# Patient Record
Sex: FEMALE | Race: WHITE
[De-identification: names, ages, dates, MRNs, and addresses within clinical notes are randomized per-mention and may not be internally consistent; named-entity substitution may affect disease eponyms.]

---

## 2018-12-29 ENCOUNTER — HOSPITAL ENCOUNTER (INPATIENT)
Dept: HOSPITAL 54 - MED | Age: 53
LOS: 5 days | Discharge: HOME | DRG: 248 | End: 2019-01-03
Attending: NURSE PRACTITIONER | Admitting: INTERNAL MEDICINE
Payer: MEDICAID

## 2018-12-29 VITALS — HEIGHT: 61 IN | WEIGHT: 177.56 LBS | BODY MASS INDEX: 33.52 KG/M2

## 2018-12-29 DIAGNOSIS — E78.5: ICD-10-CM

## 2018-12-29 DIAGNOSIS — E87.6: ICD-10-CM

## 2018-12-29 DIAGNOSIS — I25.10: ICD-10-CM

## 2018-12-29 DIAGNOSIS — E83.42: ICD-10-CM

## 2018-12-29 DIAGNOSIS — E66.9: ICD-10-CM

## 2018-12-29 DIAGNOSIS — A04.72: Primary | ICD-10-CM

## 2018-12-29 DIAGNOSIS — E11.9: ICD-10-CM

## 2018-12-29 DIAGNOSIS — I10: ICD-10-CM

## 2018-12-29 PROCEDURE — G0378 HOSPITAL OBSERVATION PER HR: HCPCS

## 2018-12-29 NOTE — NUR
MS RN NOTES 



RECEIVED PATIENT FROM [facility] VIA [facility]ONERY[facility], ALERT, ORIENTED X 4, NO SIGNS OF ACUTE 
RESPIRATORY DISTRESS NOTED. PATIENT COMPLAINED OF ABDOMINAL PAIN, INITIAL PHYSICAL 
ASSESSMENT DONE. HOME MEDS ENTERED WILL INFORM MD TO REVIEW. ORIENTED PATIENT TO ROOM AND 
USE OF CALL LIGHT. SAFETY MEASURES MAINTAINED, BED IN LOW LOCK POSITION, WILL CONTINUE TO 
MONITOR ACCORDINGLY.

## 2018-12-30 VITALS — SYSTOLIC BLOOD PRESSURE: 162 MMHG | DIASTOLIC BLOOD PRESSURE: 81 MMHG

## 2018-12-30 VITALS — SYSTOLIC BLOOD PRESSURE: 156 MMHG | DIASTOLIC BLOOD PRESSURE: 85 MMHG

## 2018-12-30 VITALS — DIASTOLIC BLOOD PRESSURE: 90 MMHG | SYSTOLIC BLOOD PRESSURE: 167 MMHG

## 2018-12-30 VITALS — DIASTOLIC BLOOD PRESSURE: 67 MMHG | SYSTOLIC BLOOD PRESSURE: 154 MMHG

## 2018-12-30 LAB
ALBUMIN SERPL BCP-MCNC: 3.6 G/DL (ref 3.4–5)
ALP SERPL-CCNC: 85 U/L (ref 46–116)
ALT SERPL W P-5'-P-CCNC: 44 U/L (ref 12–78)
APPEARANCE UR: CLEAR
AST SERPL W P-5'-P-CCNC: 36 U/L (ref 15–37)
BASOPHILS # BLD AUTO: 0 /CMM (ref 0–0.2)
BASOPHILS NFR BLD AUTO: 0.5 % (ref 0–2)
BILIRUB SERPL-MCNC: 0.5 MG/DL (ref 0.2–1)
BILIRUB UR QL STRIP: NEGATIVE
BUN SERPL-MCNC: 6 MG/DL (ref 7–18)
CALCIUM SERPL-MCNC: 8.4 MG/DL (ref 8.5–10.1)
CHLORIDE SERPL-SCNC: 102 MMOL/L (ref 98–107)
CHOLEST SERPL-MCNC: 143 MG/DL (ref ?–200)
CO2 SERPL-SCNC: 29 MMOL/L (ref 21–32)
COLOR UR: YELLOW
CREAT SERPL-MCNC: 0.6 MG/DL (ref 0.6–1.3)
EOSINOPHIL NFR BLD AUTO: 2.1 % (ref 0–6)
GLUCOSE SERPL-MCNC: 115 MG/DL (ref 74–106)
GLUCOSE UR STRIP-MCNC: NEGATIVE MG/DL
HCT VFR BLD AUTO: 39 % (ref 33–45)
HDLC SERPL-MCNC: 46 MG/DL (ref 40–60)
HGB BLD-MCNC: 12.9 G/DL (ref 11.5–14.8)
HGB UR QL STRIP: NEGATIVE ERY/UL
KETONES UR STRIP-MCNC: NEGATIVE MG/DL
LDLC SERPL DIRECT ASSAY-MCNC: 84 MG/DL (ref 0–99)
LEUKOCYTE ESTERASE UR QL STRIP: NEGATIVE
LYMPHOCYTES NFR BLD AUTO: 3 /CMM (ref 0.8–4.8)
LYMPHOCYTES NFR BLD AUTO: 32.3 % (ref 20–44)
MAGNESIUM SERPL-MCNC: 1.7 MG/DL (ref 1.8–2.4)
MCHC RBC AUTO-ENTMCNC: 33 G/DL (ref 31–36)
MCV RBC AUTO: 87 FL (ref 82–100)
MONOCYTES NFR BLD AUTO: 0.6 /CMM (ref 0.1–1.3)
MONOCYTES NFR BLD AUTO: 6.2 % (ref 2–12)
NEUTROPHILS # BLD AUTO: 5.4 /CMM (ref 1.8–8.9)
NEUTROPHILS NFR BLD AUTO: 58.9 % (ref 43–81)
NITRITE UR QL STRIP: NEGATIVE
PH UR STRIP: 6.5 [PH] (ref 5–8)
PHOSPHATE SERPL-MCNC: 2.8 MG/DL (ref 2.5–4.9)
PLATELET # BLD AUTO: 418 /CMM (ref 150–450)
POTASSIUM SERPL-SCNC: 2.9 MMOL/L (ref 3.5–5.1)
PROT SERPL-MCNC: 7.4 G/DL (ref 6.4–8.2)
PROT UR QL STRIP: NEGATIVE MG/DL
RBC # BLD AUTO: 4.5 MIL/UL (ref 4–5.2)
SODIUM SERPL-SCNC: 140 MMOL/L (ref 136–145)
TRIGL SERPL-MCNC: 116 MG/DL (ref 30–150)
TSH SERPL DL<=0.005 MIU/L-ACNC: 1.7 UIU/ML (ref 0.36–3.74)
UROBILINOGEN UR STRIP-MCNC: 0.2 EU/DL
WBC NRBC COR # BLD AUTO: 9.2 K/UL (ref 4.3–11)

## 2018-12-30 RX ADMIN — VANCOMYCIN HYDROCHLORIDE SCH MG: 125 CAPSULE ORAL at 23:38

## 2018-12-30 RX ADMIN — Medication PRN MG: at 08:08

## 2018-12-30 RX ADMIN — MAGNESIUM SULFATE IN DEXTROSE SCH MLS/HR: 10 INJECTION, SOLUTION INTRAVENOUS at 10:26

## 2018-12-30 RX ADMIN — VANCOMYCIN HYDROCHLORIDE SCH MG: 125 CAPSULE ORAL at 01:00

## 2018-12-30 RX ADMIN — METRONIDAZOLE SCH MG: 500 TABLET ORAL at 02:14

## 2018-12-30 RX ADMIN — VANCOMYCIN HYDROCHLORIDE SCH MG: 125 CAPSULE ORAL at 06:30

## 2018-12-30 RX ADMIN — VANCOMYCIN HYDROCHLORIDE SCH MG: 125 CAPSULE ORAL at 12:55

## 2018-12-30 RX ADMIN — ATORVASTATIN CALCIUM SCH MG: 10 TABLET, FILM COATED ORAL at 22:02

## 2018-12-30 RX ADMIN — DEXTROSE MONOHYDRATE PRN MG: 50 INJECTION, SOLUTION INTRAVENOUS at 02:22

## 2018-12-30 RX ADMIN — Medication PRN MG: at 15:58

## 2018-12-30 RX ADMIN — VANCOMYCIN HYDROCHLORIDE SCH MG: 125 CAPSULE ORAL at 17:40

## 2018-12-30 RX ADMIN — Medication PRN MG: at 02:15

## 2018-12-30 RX ADMIN — POTASSIUM CHLORIDE SCH MEQ: 1.5 POWDER, FOR SOLUTION ORAL at 12:55

## 2018-12-30 RX ADMIN — METRONIDAZOLE SCH MG: 500 TABLET ORAL at 16:03

## 2018-12-30 RX ADMIN — ALPRAZOLAM PRN MG: 1 TABLET ORAL at 22:07

## 2018-12-30 RX ADMIN — POTASSIUM CHLORIDE SCH MEQ: 1.5 POWDER, FOR SOLUTION ORAL at 10:26

## 2018-12-30 RX ADMIN — SODIUM CHLORIDE PRN MLS/HR: 9 INJECTION, SOLUTION INTRAVENOUS at 02:28

## 2018-12-30 RX ADMIN — POTASSIUM CHLORIDE SCH MEQ: 1.5 POWDER, FOR SOLUTION ORAL at 10:55

## 2018-12-30 RX ADMIN — METRONIDAZOLE SCH MG: 500 TABLET ORAL at 08:06

## 2018-12-30 RX ADMIN — MAGNESIUM SULFATE IN DEXTROSE SCH MLS/HR: 10 INJECTION, SOLUTION INTRAVENOUS at 11:31

## 2018-12-30 RX ADMIN — Medication PRN MG: at 20:12

## 2018-12-30 NOTE — NUR
RN NOTES

 ADMINISTERED MORPHINE SULFATE 0.25 MG  IV PUSH FOR  GENERALIZED PAIN PER PATIENT REQUEAST, 
V/S TAKEN /67, P-64,  CONTINUED MONITORING.

## 2018-12-30 NOTE — NUR
RN NOTES

 RECEIVED PATIENT IN THE BED A/O X4, PATIENT HAS NO ACUTE RESPIRATORY DISTRESS, V/S STABLE, 
SCHEDULED MEDICATION ADMINISTERED. PATIENT SELF CARE. AMBULATORY. INFUSING NS AT  75 ML/HR 
INTACT ON LFT AC AREA INTACT. PATIENT C/O PAIN AT THIS TIME  IN THE ABDOMINAL AREA 8/10. 
CALL LIGHT WITHIN TO REACH, SAFETY PRECAUTION MAINTAINED ALL THE TIME. CONTINUED MONITORING.

## 2018-12-30 NOTE — NUR
RN MS NOTES

RECEIVED PATIENT IN BED AWAKE, ALERT AND ORIENTED X4, VERBALLY RESPONSIVE, ABLE TO MAKE 
NEEDS KNOWN. BREATHING EVEN AND UNLABORED. NO SOB NOTED - TOLERATING ROOM AIR. WITH 
COMPLAINTS OF ABDOMINAL PAIN 8/10. WILL ADMINISTER PAIN MEDICATION PER SCHEDULE. IV ON LEFT 
AC#20 IN TACT AND PATENT. SKIN DRY AND WARM TO TOUCH. AFEBRILE. ALL OTHER NEEDS ATTENDED TO. 
SAFETY MEASURES IN PLACE. CALL LIGHT WITHIN REACH. WILL CONTINUE TO MONITOR.

## 2018-12-30 NOTE — NUR
rn notes

 administered morphine 0.25 mg ml iv push for abdominal pain 8/10 per patient request, v/s 
taken bp 167/90, p-75, continued monitoring.

## 2018-12-30 NOTE — NUR
MS RN NOTES



CALLED AND SPOKE TO MD, INFORMED THAT HOME MEDICATIONS WERE ENTERED IN [employer] [facility], AND TO 
BE REVIEWED. MD WILL REVIEW MEDICATIONS. WILL CONTINUE TO MONITOR.

## 2018-12-30 NOTE — NUR
RN NOTES

 ADMINISTERED SCHEDULED MEDICATION. MEDICATION WERE ADMINISTERED FOR PAIN EFFECTIVE. FAMILY 
NEXT TO THE BED. CALL LIGHT WITHIN TO REACH. CONTINUED MONITORING.

## 2018-12-30 NOTE — NUR
MS RN NOTES 





PATIENT RESTING COMFORTABLY AT THIS TIME , ORIENTED X 4, NO SIGNS OF ACUTE RESPIRATORY 
DISTRESS NOTED. PA SAFETY MEASURES MAINTAINED, BED IN LOW LOCK POSITION, ENDORSE TO AM NURSE 
FOR RD

## 2018-12-30 NOTE — NUR
MS RN NOTES



VANCOCIN 250MG TAB NOT AVAILABLE AT THIS TIME CHECKED WITH RN SUPERVISOR. WILL ENDORSE TO AM 
NURSE TO FOLLOW UP WITH THE PHARMACY.

## 2018-12-31 VITALS — SYSTOLIC BLOOD PRESSURE: 146 MMHG | DIASTOLIC BLOOD PRESSURE: 80 MMHG

## 2018-12-31 VITALS — DIASTOLIC BLOOD PRESSURE: 71 MMHG | SYSTOLIC BLOOD PRESSURE: 134 MMHG

## 2018-12-31 VITALS — DIASTOLIC BLOOD PRESSURE: 89 MMHG | SYSTOLIC BLOOD PRESSURE: 139 MMHG

## 2018-12-31 RX ADMIN — VANCOMYCIN HYDROCHLORIDE SCH MG: 125 CAPSULE ORAL at 17:14

## 2018-12-31 RX ADMIN — HYDROMORPHONE HYDROCHLORIDE PRN MG: 1 INJECTION, SOLUTION INTRAMUSCULAR; INTRAVENOUS; SUBCUTANEOUS at 17:13

## 2018-12-31 RX ADMIN — VANCOMYCIN HYDROCHLORIDE SCH MG: 125 CAPSULE ORAL at 05:35

## 2018-12-31 RX ADMIN — Medication PRN MG: at 23:41

## 2018-12-31 RX ADMIN — MEMANTINE HYDROCHLORIDE SCH MG: 5 TABLET ORAL at 08:09

## 2018-12-31 RX ADMIN — VANCOMYCIN HYDROCHLORIDE SCH MG: 125 CAPSULE ORAL at 11:44

## 2018-12-31 RX ADMIN — FERROUS SULFATE TAB 325 MG (65 MG ELEMENTAL FE) SCH MG: 325 (65 FE) TAB at 08:19

## 2018-12-31 RX ADMIN — METRONIDAZOLE SCH MG: 500 TABLET ORAL at 17:14

## 2018-12-31 RX ADMIN — METRONIDAZOLE SCH MG: 500 TABLET ORAL at 08:05

## 2018-12-31 RX ADMIN — FERROUS SULFATE TAB 325 MG (65 MG ELEMENTAL FE) SCH MG: 325 (65 FE) TAB at 08:06

## 2018-12-31 RX ADMIN — Medication PRN MG: at 05:57

## 2018-12-31 RX ADMIN — LOSARTAN POTASSIUM SCH MG: 50 TABLET, FILM COATED ORAL at 08:06

## 2018-12-31 RX ADMIN — METRONIDAZOLE SCH MG: 500 TABLET ORAL at 01:26

## 2018-12-31 RX ADMIN — HYDROMORPHONE HYDROCHLORIDE PRN MG: 1 INJECTION, SOLUTION INTRAMUSCULAR; INTRAVENOUS; SUBCUTANEOUS at 10:50

## 2018-12-31 RX ADMIN — Medication SCH MG: at 08:07

## 2018-12-31 RX ADMIN — SODIUM CHLORIDE PRN MLS/HR: 9 INJECTION, SOLUTION INTRAVENOUS at 10:49

## 2018-12-31 RX ADMIN — Medication PRN MG: at 01:26

## 2018-12-31 RX ADMIN — ALPRAZOLAM PRN MG: 1 TABLET ORAL at 23:44

## 2018-12-31 RX ADMIN — ATORVASTATIN CALCIUM SCH MG: 10 TABLET, FILM COATED ORAL at 22:13

## 2018-12-31 RX ADMIN — PANTOPRAZOLE SODIUM SCH MG: 40 TABLET, DELAYED RELEASE ORAL at 08:06

## 2018-12-31 RX ADMIN — CLOPIDOGREL BISULFATE SCH MG: 75 TABLET, FILM COATED ORAL at 08:05

## 2018-12-31 NOTE — NUR
RN MS CLOSING NOTES

PATIENT IN BED AWAKE. NO ACUTE CHANGES THROUGHOUT SHIFT. NO DISTRESS. BREATHING EVEN AND 
UNLABORED. NO SOB. TOLERATING ROOM AIR. WITH COMPLAINTS OF PAIN ON THE ABDOMEN - MANAGED BY 
PAIN MEDICATION. IV ON LEFT AC INTACT AND PATENT. AFEBRILE. KEPT CLEAN DRY AND COMFORTABLE. 
ALL OTHER NEEDS ATTENDED TO. SAFETY MEASURES IN PLACE. REMAINS ON CONTACT ISOLATION. CALL 
LIGHT WITHIN REACH. WILL ENDORSE TO ON COMING NURSE FOR RD.

## 2018-12-31 NOTE — NUR
MS RN OPENING NOTE



RECEIVED PT IN BED, SLEEPING. NO ACUTE DISTRESS NOTED AT THIS TIME, BREATHING IS EVEN AND 
UNLABORED ON ROOM AIR. L AC #20G IV IS INFUSING NS @ 75ML/HR WITHOUT REDNESS OR SWELLING. 
CONTACT PRECAUTIONS MAINTAINED. ALL NEEDS ATTENDED TO, BED IS LOCKED AND IN LOWEST POSITION, 
SIDE RAILS UP X2, BED ALARM ON, CALL LIGHT WITHIN REACH.

## 2018-12-31 NOTE — NUR
MS RN CONTACTED DR. ZURITA AGAIN



CONTACTED DR. ZURITA REGARDING PRN FOR ITCHING. AWAITING RESPONSE.

## 2018-12-31 NOTE — NUR
MS RN MORNING MEDICATIONS



MEMANTINE 10MG PULLED OUT TWICE BECAUSE WRONG MEDICATION WAS PULLED OUT THE FIRST TIME. ONLY 
MEMANTINE 10MG ADMINISTERED AS ORDERED.

## 2018-12-31 NOTE — NUR
MS RN OPENING NOTES



Received patient awake on bed. With complaints of urticaria. With episodes of diarrhea. With 
patent peripheral IV line with NS @ 75ml/hr as ordered. Kept clean, dry and comfortable. To 
follow up on call Md for urticaria. Will continue to monitor patient

## 2018-12-31 NOTE — NUR
MS RN CT ABD WITH ORAL CONTRAST



CONFIRMED WITH DR. ZURITA THAT CT OF THE ABDOMEN WITH ORAL CONTRAST IS STILL REQUIRED. ORDERS 
OBTAINED, PT MADE NPO AND AGREEABLE TO PLAN OF CARE. RADIOLOGY DEPARTMENT INFORMED.

## 2018-12-31 NOTE — NUR
MS RN LABS



CONTACTED  INQUIRING IF HE WOULD LIKE TO ORDER LABS TO RECHECK SINCE K LEVEL WAS 2.9 
AND MG WAS 1.7 YESTERDAY. AWAITING RESPONSE.

## 2018-12-31 NOTE — NUR
MS RN PT CLAUDIA TO SHOWER



OBTAINED ORDERS FROM DR. ZURITA FOR CLAUDIA TO ROMARIO, WILL INFORM PT.

## 2018-12-31 NOTE — NUR
MS RN CLOSING NOTE



PT SITTING UP AND BE. PT IS ALERT AND ORIENTED X4, DENIES VOMITING, CHEST PAIN, SOB. 
BREATHING IS EVEN AND UNLABORED ON ROOM AIR. L AC #20G IV IS INFUSING NS @ 75ML/HR WITHOUT 
REDNESS OR SWELLING. CONTACT PRECAUTIONS MAINTAINED. ADLS PROVIDED, PT ASSISTED TO SHOWER 
TODAY WITHOUT INCIDENT. ALL NEEDS ATTENDED TO, BED IS LOCKED AND IN LOWEST POSITION, SIDE 
RAILS UP X2, BED ALARM ON, CALL LIGHT WITHIN REACH. STILL PENDING PRN ORDER FOR ITCHING. 
WILL ENDORSE TO NIGHT SHIFT NURSE FOR CONTINUITY OF CARE.

## 2018-12-31 NOTE — NUR
MS SUBRAMANIAN PRN FOR ITCHING



CONTACTED  REQUESTING PRN MEDICATION FOR PT ITCHING ASSOCIATED WITH DILAUDID 
ADMINISTRATION. PER DR.SAM BURNETTE TODAY, 

-------------------------------------------------------------------------------

Addendum: 12/31/18 at 1755 by JACINTO DAVIS RN

-------------------------------------------------------------------------------

ERROR PLEASE DISREGARD NOTE.

## 2018-12-31 NOTE — NUR
MS RN NOTES



Received an order from MD for Benadryl 0.25mg IV Q6H for itching. Administered as ordered. 
Monitored patient closely.

## 2018-12-31 NOTE — NUR
MS RN MORPHINE



PER PHARMACY THERE IS A SEVERE SHORTAGE OF MORPHINE HOSPITAL WIDE AND IT IS BEING RESERVED 
FOR SPECIFIC CASES/PATIENTS. PHARMACY SUGGESTED SWITCHING TO DILAUDID FOR PAIN CONTROL 
INSTEAD UNLESS A TRUE ALLERGY IS PRESENT WITH DILAUDID. DISCUSSED WITH PT REGARDING REPORTED 
DILAUDID ALLERGY PER PT SHE GETS ITCHING, PT DENIES ANY SOB, HIVES, CHEST PAIN OR DIFFICULTY 
BREATHING WHEN TAKING DILAUDID. INFORMED DR. ZURITA OF SITUATION. AND AWAITING RESPONSE.

## 2018-12-31 NOTE — NUR
MS RN PRN FOR ITCHING



CONTACTED  REQUESTING PRN MEDICATION FOR PT ITCHING ASSOCIATED WITH DILAUDID 
ADMINISTRATION. AWAITING RESPONSE.

## 2019-01-01 VITALS — SYSTOLIC BLOOD PRESSURE: 132 MMHG | DIASTOLIC BLOOD PRESSURE: 83 MMHG

## 2019-01-01 VITALS — DIASTOLIC BLOOD PRESSURE: 73 MMHG | SYSTOLIC BLOOD PRESSURE: 114 MMHG

## 2019-01-01 VITALS — SYSTOLIC BLOOD PRESSURE: 134 MMHG | DIASTOLIC BLOOD PRESSURE: 68 MMHG

## 2019-01-01 LAB
BASOPHILS # BLD AUTO: 0 /CMM (ref 0–0.2)
BASOPHILS NFR BLD AUTO: 0.5 % (ref 0–2)
BUN SERPL-MCNC: 6 MG/DL (ref 7–18)
CALCIUM SERPL-MCNC: 8.7 MG/DL (ref 8.5–10.1)
CHLORIDE SERPL-SCNC: 103 MMOL/L (ref 98–107)
CO2 SERPL-SCNC: 31 MMOL/L (ref 21–32)
CREAT SERPL-MCNC: 0.8 MG/DL (ref 0.6–1.3)
EOSINOPHIL NFR BLD AUTO: 2.5 % (ref 0–6)
GLUCOSE SERPL-MCNC: 104 MG/DL (ref 74–106)
HCT VFR BLD AUTO: 38 % (ref 33–45)
HGB BLD-MCNC: 12.9 G/DL (ref 11.5–14.8)
LYMPHOCYTES NFR BLD AUTO: 3 /CMM (ref 0.8–4.8)
LYMPHOCYTES NFR BLD AUTO: 35 % (ref 20–44)
MCHC RBC AUTO-ENTMCNC: 34 G/DL (ref 31–36)
MCV RBC AUTO: 86 FL (ref 82–100)
MONOCYTES NFR BLD AUTO: 0.6 /CMM (ref 0.1–1.3)
MONOCYTES NFR BLD AUTO: 6.4 % (ref 2–12)
NEUTROPHILS # BLD AUTO: 4.8 /CMM (ref 1.8–8.9)
NEUTROPHILS NFR BLD AUTO: 55.6 % (ref 43–81)
PLATELET # BLD AUTO: 458 /CMM (ref 150–450)
POTASSIUM SERPL-SCNC: 2.9 MMOL/L (ref 3.5–5.1)
RBC # BLD AUTO: 4.46 MIL/UL (ref 4–5.2)
SODIUM SERPL-SCNC: 142 MMOL/L (ref 136–145)
WBC NRBC COR # BLD AUTO: 8.6 K/UL (ref 4.3–11)

## 2019-01-01 RX ADMIN — SODIUM CHLORIDE PRN MLS/HR: 9 INJECTION, SOLUTION INTRAVENOUS at 17:26

## 2019-01-01 RX ADMIN — POTASSIUM CHLORIDE SCH MEQ: 1.5 POWDER, FOR SOLUTION ORAL at 11:38

## 2019-01-01 RX ADMIN — CLOPIDOGREL BISULFATE SCH MG: 75 TABLET, FILM COATED ORAL at 08:44

## 2019-01-01 RX ADMIN — PANTOPRAZOLE SODIUM SCH MG: 40 TABLET, DELAYED RELEASE ORAL at 08:44

## 2019-01-01 RX ADMIN — FENTANYL CITRATE PRN MCG: 50 INJECTION, SOLUTION INTRAMUSCULAR; INTRAVENOUS at 21:27

## 2019-01-01 RX ADMIN — MEMANTINE HYDROCHLORIDE SCH MG: 5 TABLET ORAL at 08:45

## 2019-01-01 RX ADMIN — Medication PRN MG: at 08:54

## 2019-01-01 RX ADMIN — METRONIDAZOLE SCH MG: 500 TABLET ORAL at 08:45

## 2019-01-01 RX ADMIN — POTASSIUM CHLORIDE SCH MEQ: 1.5 POWDER, FOR SOLUTION ORAL at 13:51

## 2019-01-01 RX ADMIN — LOSARTAN POTASSIUM SCH MG: 50 TABLET, FILM COATED ORAL at 08:46

## 2019-01-01 RX ADMIN — VANCOMYCIN HYDROCHLORIDE SCH MG: 125 CAPSULE ORAL at 00:10

## 2019-01-01 RX ADMIN — Medication SCH MG: at 08:45

## 2019-01-01 RX ADMIN — VANCOMYCIN HYDROCHLORIDE SCH MG: 125 CAPSULE ORAL at 17:35

## 2019-01-01 RX ADMIN — ALPRAZOLAM PRN MG: 1 TABLET ORAL at 17:35

## 2019-01-01 RX ADMIN — VANCOMYCIN HYDROCHLORIDE SCH MG: 125 CAPSULE ORAL at 06:21

## 2019-01-01 RX ADMIN — FERROUS SULFATE TAB 325 MG (65 MG ELEMENTAL FE) SCH MG: 325 (65 FE) TAB at 08:45

## 2019-01-01 RX ADMIN — ZOLPIDEM TARTRATE PRN MG: 5 TABLET, FILM COATED ORAL at 00:28

## 2019-01-01 RX ADMIN — METRONIDAZOLE SCH MG: 500 TABLET ORAL at 00:27

## 2019-01-01 RX ADMIN — METRONIDAZOLE SCH MG: 500 TABLET ORAL at 17:22

## 2019-01-01 RX ADMIN — ATORVASTATIN CALCIUM SCH MG: 10 TABLET, FILM COATED ORAL at 21:28

## 2019-01-01 RX ADMIN — POTASSIUM CHLORIDE SCH MEQ: 1.5 POWDER, FOR SOLUTION ORAL at 12:49

## 2019-01-01 RX ADMIN — VANCOMYCIN HYDROCHLORIDE SCH MG: 125 CAPSULE ORAL at 12:22

## 2019-01-01 NOTE — NUR
MS/RN   OPENING NOTE



THE PATIENT IS RECEIVED SLEEPING IN BED. RESPONSIVE TO VERBAL STIMULI AND ABLE TO MAKE NEEDS 
KNOWN VERBALLY. ALERT AND ORIENTED X4. IN ROOM AIR AND DENIES SOB. RESPIRATION REGULAR AND 
UNLABORED. DENIES PAIN. NO BM AT THIS TIME. LAC G 20 PATENT AND SALINE LOCKED. BED LOW AND 
LOCKED. SIDE RAILS UP X3. CALL LIGHT WITHIN REACH. WILL CONTINUE TO MONITOR.

## 2019-01-01 NOTE — NUR
MS RN CLOSING NOTES



Patient asleep on bed on left side lying position. With patent peripheral IV line with NS 
infusing well @ 75ml/hr as ordered. Patient claimed still having diarrhea, on PO Vanco 250mg 
Q6h for C-diff. Medicated for pain, noted effective. Medicated for itching, noted to be 
effective. All needs attended. Afebrile the whole shift. Endorsed to the next shift.

## 2019-01-01 NOTE — NUR
MS/RN   CLOSING NOTE



THE PATIENT IS ALERT AND ORIENTED X4. IN ROOM AIR AND SATURATION IS AT 97%. DENIES SOB. 
RESPIRATION REGULAR AND UNLABORED. DENIES PAIN. REMAINS ON CLEAR LIQUID DIET. LAC G 20 
PATENT AND NORMAL SALINE IS INFUSING AT 75ML/HR. NO S/S INFILTRATION NOTED. BED LOW AND 
LOCKED. SIDE RAILS UP X3. CALL LIGHT WITHIN REACH. WILL ENDORSE TO NIGHT SHIFT.

## 2019-01-01 NOTE — NUR
MS/RN   NOTE



THE PATIENT REQUESTED XANAX 1 MG HS PRN TO BE CHANGED TO XANAX 1 MG Q12 PRN. RENETTA JONES GAVE 
THE ORDER. READ BACK THE ORDER, NOTED AND CARRIED OUT. THE PATIENT IS MADE AWARE.

## 2019-01-01 NOTE — NUR
FENTANYL GIVEN AS ORDERED FOR C/O ABD PAIN . WILL CONT TO MONITOR FOR EFFECTIVENESS AND 
POSSIBLE ALLERGIC REACTION.

## 2019-01-02 VITALS — SYSTOLIC BLOOD PRESSURE: 126 MMHG | DIASTOLIC BLOOD PRESSURE: 74 MMHG

## 2019-01-02 VITALS — DIASTOLIC BLOOD PRESSURE: 59 MMHG | SYSTOLIC BLOOD PRESSURE: 127 MMHG

## 2019-01-02 VITALS — DIASTOLIC BLOOD PRESSURE: 71 MMHG | SYSTOLIC BLOOD PRESSURE: 131 MMHG

## 2019-01-02 LAB
BASOPHILS # BLD AUTO: 0.1 /CMM (ref 0–0.2)
BASOPHILS NFR BLD AUTO: 0.8 % (ref 0–2)
BUN SERPL-MCNC: 6 MG/DL (ref 7–18)
CALCIUM SERPL-MCNC: 8.9 MG/DL (ref 8.5–10.1)
CHLORIDE SERPL-SCNC: 105 MMOL/L (ref 98–107)
CO2 SERPL-SCNC: 30 MMOL/L (ref 21–32)
CREAT SERPL-MCNC: 0.8 MG/DL (ref 0.6–1.3)
EOSINOPHIL NFR BLD AUTO: 3.4 % (ref 0–6)
GLUCOSE SERPL-MCNC: 104 MG/DL (ref 74–106)
HCT VFR BLD AUTO: 39 % (ref 33–45)
HGB BLD-MCNC: 13 G/DL (ref 11.5–14.8)
LYMPHOCYTES NFR BLD AUTO: 3 /CMM (ref 0.8–4.8)
LYMPHOCYTES NFR BLD AUTO: 44 % (ref 20–44)
MAGNESIUM SERPL-MCNC: 1.9 MG/DL (ref 1.8–2.4)
MCHC RBC AUTO-ENTMCNC: 33 G/DL (ref 31–36)
MCV RBC AUTO: 87 FL (ref 82–100)
MONOCYTES NFR BLD AUTO: 0.5 /CMM (ref 0.1–1.3)
MONOCYTES NFR BLD AUTO: 6.8 % (ref 2–12)
NEUTROPHILS # BLD AUTO: 3.1 /CMM (ref 1.8–8.9)
NEUTROPHILS NFR BLD AUTO: 45 % (ref 43–81)
PHOSPHATE SERPL-MCNC: 3.4 MG/DL (ref 2.5–4.9)
PLATELET # BLD AUTO: 464 /CMM (ref 150–450)
POTASSIUM SERPL-SCNC: 3.1 MMOL/L (ref 3.5–5.1)
RBC # BLD AUTO: 4.52 MIL/UL (ref 4–5.2)
SODIUM SERPL-SCNC: 144 MMOL/L (ref 136–145)
WBC NRBC COR # BLD AUTO: 6.8 K/UL (ref 4.3–11)

## 2019-01-02 RX ADMIN — ATORVASTATIN CALCIUM SCH MG: 10 TABLET, FILM COATED ORAL at 21:33

## 2019-01-02 RX ADMIN — POTASSIUM CHLORIDE SCH MLS/HR: 200 INJECTION, SOLUTION INTRAVENOUS at 12:44

## 2019-01-02 RX ADMIN — DEXTROSE MONOHYDRATE PRN MG: 50 INJECTION, SOLUTION INTRAVENOUS at 18:41

## 2019-01-02 RX ADMIN — POTASSIUM CHLORIDE SCH MLS/HR: 200 INJECTION, SOLUTION INTRAVENOUS at 13:33

## 2019-01-02 RX ADMIN — VANCOMYCIN HYDROCHLORIDE SCH MG: 125 CAPSULE ORAL at 17:09

## 2019-01-02 RX ADMIN — VANCOMYCIN HYDROCHLORIDE SCH MG: 125 CAPSULE ORAL at 06:31

## 2019-01-02 RX ADMIN — Medication SCH MG: at 10:58

## 2019-01-02 RX ADMIN — CLOPIDOGREL BISULFATE SCH MG: 75 TABLET, FILM COATED ORAL at 10:44

## 2019-01-02 RX ADMIN — METRONIDAZOLE SCH MG: 500 TABLET ORAL at 17:09

## 2019-01-02 RX ADMIN — VANCOMYCIN HYDROCHLORIDE SCH MG: 125 CAPSULE ORAL at 14:49

## 2019-01-02 RX ADMIN — METRONIDAZOLE SCH MG: 500 TABLET ORAL at 00:49

## 2019-01-02 RX ADMIN — LOSARTAN POTASSIUM SCH MG: 50 TABLET, FILM COATED ORAL at 10:58

## 2019-01-02 RX ADMIN — MEMANTINE HYDROCHLORIDE SCH MG: 5 TABLET ORAL at 10:43

## 2019-01-02 RX ADMIN — FENTANYL CITRATE PRN MCG: 50 INJECTION, SOLUTION INTRAMUSCULAR; INTRAVENOUS at 12:12

## 2019-01-02 RX ADMIN — FERROUS SULFATE TAB 325 MG (65 MG ELEMENTAL FE) SCH MG: 325 (65 FE) TAB at 10:43

## 2019-01-02 RX ADMIN — FENTANYL CITRATE PRN MCG: 50 INJECTION, SOLUTION INTRAMUSCULAR; INTRAVENOUS at 22:36

## 2019-01-02 RX ADMIN — METRONIDAZOLE SCH MG: 500 TABLET ORAL at 10:44

## 2019-01-02 RX ADMIN — VANCOMYCIN HYDROCHLORIDE SCH MG: 125 CAPSULE ORAL at 00:49

## 2019-01-02 RX ADMIN — ZOLPIDEM TARTRATE PRN MG: 5 TABLET, FILM COATED ORAL at 00:57

## 2019-01-02 RX ADMIN — ALPRAZOLAM PRN MG: 1 TABLET ORAL at 21:38

## 2019-01-02 RX ADMIN — PANTOPRAZOLE SODIUM SCH MG: 40 TABLET, DELAYED RELEASE ORAL at 10:57

## 2019-01-02 NOTE — NUR
PT IN BED AWAKE AND ALERT. BREATHING EVEN;Y. NO SOB. NO ACUTE EVENT DURING THE NIGHT . WITH 
ONGOING DIARRHEA AND ON AND OFF ABD CRAMP. NEEDS ATTENDED, BED LOW LACKED. CALL LIGHT WITHIN 
REACH, WILL CONT TO MONITOR AND NIKITA ENDORSE TO AM SHIFT FOR RD,

## 2019-01-02 NOTE — NUR
MS RN

RECEIVED ON BED, AWAKE,ALERT,ORIENTED X4,NOT IN ANY FORM OF DISTRESS, RESPIRATIONS EVEN AND 
UNLABORED,NO SOB NOTED, LUNGS ARE CLEAR,ABDOMEN SOFT,POSITIVE BOWEL SOUNDS,DENIES PAIN AT 
THIS TIME, WILL MONITOR PATIENT'S CONDITION.

## 2019-01-02 NOTE — NUR
MS/RN

PATIENT IS SLEEPING, APPEAR COMFORTABLE, BREATHING EVEN AND UNLABORED, CALL IVF INFUSING, 
CALL LIGHT IN REACH. WILL MONITOR.

## 2019-01-03 VITALS — SYSTOLIC BLOOD PRESSURE: 132 MMHG | DIASTOLIC BLOOD PRESSURE: 80 MMHG

## 2019-01-03 LAB
BASOPHILS # BLD AUTO: 0 /CMM (ref 0–0.2)
BASOPHILS NFR BLD AUTO: 0.5 % (ref 0–2)
BUN SERPL-MCNC: 5 MG/DL (ref 7–18)
CALCIUM SERPL-MCNC: 7.2 MG/DL (ref 8.5–10.1)
CHLORIDE SERPL-SCNC: 106 MMOL/L (ref 98–107)
CO2 SERPL-SCNC: 29 MMOL/L (ref 21–32)
CREAT SERPL-MCNC: 0.7 MG/DL (ref 0.6–1.3)
EOSINOPHIL NFR BLD AUTO: 2.9 % (ref 0–6)
GLUCOSE SERPL-MCNC: 92 MG/DL (ref 74–106)
HCT VFR BLD AUTO: 37 % (ref 33–45)
HGB BLD-MCNC: 12.1 G/DL (ref 11.5–14.8)
LYMPHOCYTES NFR BLD AUTO: 3.3 /CMM (ref 0.8–4.8)
LYMPHOCYTES NFR BLD AUTO: 43.2 % (ref 20–44)
MAGNESIUM SERPL-MCNC: 1.6 MG/DL (ref 1.8–2.4)
MCHC RBC AUTO-ENTMCNC: 33 G/DL (ref 31–36)
MCV RBC AUTO: 88 FL (ref 82–100)
MONOCYTES NFR BLD AUTO: 0.4 /CMM (ref 0.1–1.3)
MONOCYTES NFR BLD AUTO: 5.7 % (ref 2–12)
NEUTROPHILS # BLD AUTO: 3.7 /CMM (ref 1.8–8.9)
NEUTROPHILS NFR BLD AUTO: 47.7 % (ref 43–81)
PHOSPHATE SERPL-MCNC: 3.4 MG/DL (ref 2.5–4.9)
PLATELET # BLD AUTO: 437 /CMM (ref 150–450)
POTASSIUM SERPL-SCNC: 3.2 MMOL/L (ref 3.5–5.1)
RBC # BLD AUTO: 4.17 MIL/UL (ref 4–5.2)
SODIUM SERPL-SCNC: 143 MMOL/L (ref 136–145)
WBC NRBC COR # BLD AUTO: 7.7 K/UL (ref 4.3–11)

## 2019-01-03 RX ADMIN — SODIUM CHLORIDE PRN MLS/HR: 9 INJECTION, SOLUTION INTRAVENOUS at 06:55

## 2019-01-03 RX ADMIN — ZOLPIDEM TARTRATE PRN MG: 5 TABLET, FILM COATED ORAL at 00:51

## 2019-01-03 RX ADMIN — DEXTROSE MONOHYDRATE PRN MG: 50 INJECTION, SOLUTION INTRAVENOUS at 08:25

## 2019-01-03 RX ADMIN — FENTANYL CITRATE PRN MCG: 50 INJECTION, SOLUTION INTRAMUSCULAR; INTRAVENOUS at 06:56

## 2019-01-03 RX ADMIN — DEXTROSE MONOHYDRATE PRN MG: 50 INJECTION, SOLUTION INTRAVENOUS at 00:41

## 2019-01-03 RX ADMIN — PANTOPRAZOLE SODIUM SCH MG: 40 TABLET, DELAYED RELEASE ORAL at 08:18

## 2019-01-03 RX ADMIN — VANCOMYCIN HYDROCHLORIDE SCH MG: 125 CAPSULE ORAL at 00:40

## 2019-01-03 RX ADMIN — FERROUS SULFATE TAB 325 MG (65 MG ELEMENTAL FE) SCH MG: 325 (65 FE) TAB at 08:19

## 2019-01-03 RX ADMIN — VANCOMYCIN HYDROCHLORIDE SCH MG: 125 CAPSULE ORAL at 05:28

## 2019-01-03 RX ADMIN — FENTANYL CITRATE PRN MCG: 50 INJECTION, SOLUTION INTRAMUSCULAR; INTRAVENOUS at 02:49

## 2019-01-03 RX ADMIN — MAGNESIUM SULFATE IN DEXTROSE SCH MLS/HR: 10 INJECTION, SOLUTION INTRAVENOUS at 10:42

## 2019-01-03 RX ADMIN — Medication SCH MG: at 08:19

## 2019-01-03 RX ADMIN — LOSARTAN POTASSIUM SCH MG: 50 TABLET, FILM COATED ORAL at 08:19

## 2019-01-03 RX ADMIN — MEMANTINE HYDROCHLORIDE SCH MG: 5 TABLET ORAL at 08:18

## 2019-01-03 RX ADMIN — METRONIDAZOLE SCH MG: 500 TABLET ORAL at 00:40

## 2019-01-03 RX ADMIN — METRONIDAZOLE SCH MG: 500 TABLET ORAL at 08:18

## 2019-01-03 RX ADMIN — CLOPIDOGREL BISULFATE SCH MG: 75 TABLET, FILM COATED ORAL at 08:19

## 2019-01-03 RX ADMIN — FENTANYL CITRATE PRN MCG: 50 INJECTION, SOLUTION INTRAMUSCULAR; INTRAVENOUS at 13:31

## 2019-01-03 RX ADMIN — MAGNESIUM SULFATE IN DEXTROSE SCH MLS/HR: 10 INJECTION, SOLUTION INTRAVENOUS at 11:58

## 2019-01-03 RX ADMIN — VANCOMYCIN HYDROCHLORIDE SCH MG: 125 CAPSULE ORAL at 11:51

## 2019-01-03 NOTE — NUR
MS RN NOTES-- BLOOD SUGAR LEVEL 67. PT HAS BEEN NPO AND NOW HAS LUNCH TRAY IN FRONT OF PT. 
NO INSULIN COVERAGE. NO S/SX OF HYPOGLYCEMIA NOTED. WILL CONTINUE TO MONITOR.

## 2019-01-03 NOTE — NUR
MS RN DISCHARGE NOTE



PT DISCHARGED TO HOME IN STABLE CONDITION ACCOMPANIED BY  AQUILINO VIA PERSONAL VEHICLE. 
PT IS A/O X4, AFEBRILE. RESPIRATIONS ARE EVEN AND UNLABORED, NOT IN ANY ACUTE DISTRESS 
NOTED. PUPILS ARE REACTIVE TO LIGHT, BILATERAL HAND  ARE STRONG AND EQUAL. DENIES ANY 
PAIN, SOB, N/V AT THIS TIME. ABDOMEN IS SOFT AND NONDISTENDED, BOWEL SOUNDS ARE PRESENT IN 
ALL 4 QUADRANTS. PT STATED THAT SHES READY TO GO HOME AND BE WITH FAMILY. EXPLAINED 
DISCHARGE INSTRUCTIONS TO PT WITH VERBAL AND WRITTEN UNDERSTANDING, INCLUDING TAKING 
MEDICATIONS AS PRESCRIBED AND THOROUGH HANDWASHING. IV ACCESS REMOVED, APPLIED PRESSURE AND 
TOLERATED WELL. ID BAND REMOVED. ALL BELONGINGS WERE TAKEN WITH PT. ACCOMPANIED PT TO 
VEHICLE IN STABLE CONDITION.

## 2019-01-03 NOTE — NUR
MS RN NOTES-- PT STATED SHE WANTS TO GO HOME. SHE ALSO ASKED TO HAVE REGULAR FOODS. NOTIFIED 
RENETTA REARDON W/ ORDERS TO CHANGE DIET TO REGULAR DIET. PT MADE AWARE.

## 2019-01-03 NOTE — NUR
MS RN OPENING NOTES



RECEIVED PT LAYING IN BED, SLEEPING COMFORTABLY. PT IS EASILY AROUSABLE. ALERT AND ORIENTED 
X4, AFEBRILE. RESPIRATIONS ARE EVEN AND UNLABORED, NOT IN ANY ACUTE DISTRESS NOTED. PUPILS 
ARE REACTIVE TO LIGHT, BILATERAL HAND  ARE STRONG AND EQUAL. IV TO LAC INTACT, NO 
INFILTRATION NOTED. DRESSING KEPT CLEAN AND DRY. IV FLUIDS RUNNING AT 75ML/HR, TOLERATING 
WELL. PT ABLE TO MAKE NEEDS KNOWN. DENIES ANY CHEST PAIN, SOB, N/V. SAFETY MEASURES ARE IN 
PLACE. ISOLATION PRECAUTIONS PER PROTOCOL. INSTRUCTED PT TO USE CALL LIGHT WHEN ASSISTANCE 
IS NEEDED, CALL LIGHT IS LEFT WITHIN REACH. WILL CONTINUE TO MONITOR THROUGHOUT SHIFT FOR 
CONTINUITY OF CARE.

## 2019-01-03 NOTE — NUR
MS RN NOTES CLOSING NOTES



PATIENT ASLEEP IN BED WITH NO DISTRESS NOTED. PERIPHERAL IV INTACT AND PATENT. NPO STATUS 
EXCEPT FOR MEDS MAINTAINED. VITALS REMAIN WNL. ALL DUE MEDS GIVEN AS ORDERED WITH NO ASE 
NOTED. NO FURTHER C/O PAIN OR DISCOMFORT. CONTACT ISOLATION OBSERVED AND MAINTAINED AT ALL 
TIMES. WILL ENDORSE TO ONCOMING SHIFT.